# Patient Record
Sex: FEMALE | Race: WHITE | NOT HISPANIC OR LATINO | URBAN - METROPOLITAN AREA
[De-identification: names, ages, dates, MRNs, and addresses within clinical notes are randomized per-mention and may not be internally consistent; named-entity substitution may affect disease eponyms.]

---

## 2021-05-25 ENCOUNTER — APPOINTMENT (RX ONLY)
Dept: URBAN - METROPOLITAN AREA CLINIC 33 | Facility: CLINIC | Age: 61
Setting detail: DERMATOLOGY
End: 2021-05-25

## 2021-05-25 VITALS
DIASTOLIC BLOOD PRESSURE: 78 MMHG | HEART RATE: 99 BPM | SYSTOLIC BLOOD PRESSURE: 127 MMHG | HEIGHT: 64 IN | WEIGHT: 147 LBS | TEMPERATURE: 97.7 F

## 2021-05-25 DIAGNOSIS — R03.0 ELEVATED BLOOD-PRESSURE READING, WITHOUT DIAGNOSIS OF HYPERTENSION: ICD-10-CM

## 2021-05-25 DIAGNOSIS — R21 RASH AND OTHER NONSPECIFIC SKIN ERUPTION: ICD-10-CM | Status: INADEQUATELY CONTROLLED

## 2021-05-25 PROBLEM — Z11.59 ENCOUNTER FOR SCREENING FOR OTHER VIRAL DISEASES: Status: ACTIVE | Noted: 2021-05-25

## 2021-05-25 PROCEDURE — ? PLAN FOR BMI MANAGEMENT

## 2021-05-25 PROCEDURE — ? COUNSELING

## 2021-05-25 PROCEDURE — ? PRESCRIPTION

## 2021-05-25 PROCEDURE — 99204 OFFICE O/P NEW MOD 45 MIN: CPT

## 2021-05-25 PROCEDURE — ? REFERRAL CORRESPONDENCE

## 2021-05-25 PROCEDURE — ? ADDITIONAL NOTES

## 2021-05-25 RX ORDER — CLOBETASOL PROPIONATE 0.5 MG/ML
THIN LAYER SOLUTION TOPICAL
Qty: 1 | Refills: 0 | Status: ERX | COMMUNITY
Start: 2021-05-25

## 2021-05-25 RX ADMIN — CLOBETASOL PROPIONATE THIN LAYER: 0.5 SOLUTION TOPICAL at 00:00

## 2021-05-25 ASSESSMENT — LOCATION DETAILED DESCRIPTION DERM
LOCATION DETAILED: SUBXIPHOID
LOCATION DETAILED: RIGHT ANTERIOR PROXIMAL THIGH
LOCATION DETAILED: PERIUMBILICAL SKIN
LOCATION DETAILED: LEFT RIB CAGE
LOCATION DETAILED: RIGHT RIB CAGE
LOCATION DETAILED: SUPERIOR THORACIC SPINE
LOCATION DETAILED: EPIGASTRIC SKIN
LOCATION DETAILED: RIGHT INFERIOR MEDIAL UPPER BACK

## 2021-05-25 ASSESSMENT — SEVERITY ASSESSMENT: SEVERITY: MODERATE

## 2021-05-25 ASSESSMENT — LOCATION ZONE DERM
LOCATION ZONE: TRUNK
LOCATION ZONE: LEG

## 2021-05-25 ASSESSMENT — BSA RASH: BSA RASH: 5

## 2021-05-25 ASSESSMENT — LOCATION SIMPLE DESCRIPTION DERM
LOCATION SIMPLE: ABDOMEN
LOCATION SIMPLE: UPPER BACK
LOCATION SIMPLE: RIGHT UPPER BACK
LOCATION SIMPLE: RIGHT THIGH

## 2021-05-25 NOTE — PROCEDURE: COUNSELING
Detail Level: Zone
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 Today this doesn't entirely present as such- patient has no verbalized history of infection by strept no family history of psoriasis, or skin eruptions to her knowledge- and she states she has not had this process in the past.  She denies any pruritus and states other than a presence she doesn't have any symptoms associated . It may represent eczema, pityriasis rosea, possibly tinea versicolor- however that is remote - and/or could be related to dry skin or a dermatitis secondary to some exposure.  Cannot entirely eliminate guttate psoriasis.  History from patient directly not that helpful- she was given steroid oral and injectable which she notes didn't change a thing . She is not moisturizing regularly . At this point we are going to see if we can repair the skin and re-evaluate outcome then we will decide if biopsy would be helpful.   She has used. Dovonex and also Triamcinolone however suspect she isn't getting enough cream on the skin to benefit- so we will re-route it a bit and try and include more moisturizing in her regime.  She has stopped using these she voiced.  \\n\\nWe will treat with medicated CeraVe program - or she can use generic.  Written specific directions provided and will re-evaluate in 4 weeks - stressing she needs to moisturize well.  \\n1.  PURCHASE 2 jars (16 oz) of CeraVe cream. This is often found at your pharmacy store.  If you have trouble findings it ask your pharmacist.\\n2.   the prescription of Clobetasol Solution from your pharmacy.\\n3.  When you get home - take a marker to ONE OF THE JARS_  marking the label that says medicated and date the container. MIX ALL of the Clobetasol Solution into one jar of the CeraVe' cream and stir with a butter knife or another utensil - mix well. \\n4.  Apply the Medicated Cream to areas that bother you the most or the most irritated at least 2 - 3 x daily to the problem areas - (really good to do one of the times right after bathing!) \\n5.  THE  PLAIN NON-MEDICATED CeraVe cream needs to be applied neck to toes at least 1 x DAILY -go right over the areas where you used the medicated cream.  YOU MAY REAPPLY THE PLAIN CREAM AS MANY TIMES AS YOU WISH THROUGHOUT THE DAY - BUT HAS TO BE AT LEAST 1 X DAILY NECK TO TOES.\\n\\nGOAL:  To DECREASE and ELIMINATE THE MEDICATED CREAM and daily generous use of PLAIN non-medicated CERAVE CREAM.\\n\\nApplication challenges:\\n1.  If having difficulty reaching an area - options:\\n     *Small foam paint roller\\n      *Back/flat side of back brush covere with       saran or a small plastic bag\\n      *Long strip of vinyl from fabric store cast offs (smear and rub on back)\\n      *Long handled spatula\\n\\nSuggestion to help with itch - if she has note as follows-\\n1.  Wet wash cloths- put in the freezer- to become frozen and then applied to an area of itch and allowed to thaw.   A bag of frozen peas covered with a dishcloth for 10-15 minutes will also do much the same thing - this will stimulate the nerves without damaging the skin.  \\n2.  May use CeraVe ITCH cream found over the counter  - for itch relief - these DO NOT REPLACE moisturizing with the plain cream.  \\n3.  Scratching the skin does not repair the skin - it feeds the problem of itch (releases more histamine) - as well as showers and baths that are long and hot. \\n\\n\\n
Quality 317: Preventative Care And Screening: Screening For High Blood Pressure And Follow-Up Documented: Pre-hypertensive or hypertensive blood pressure reading documented, and the indicated follow-up is documented
Detail Level: Detailed

## 2021-05-25 NOTE — HPI: RASH
What Type Of Note Output Would You Prefer (Optional)?: Bullet Format
How Severe Is Your Rash?: moderate
Is This A New Presentation, Or A Follow-Up?: Rash
Additional History: Patient states she has been putting baby powder on the areas. She notes her PCP prescribed 2 different ointments and neither of them helped improve eruption areas.

## 2021-06-22 ENCOUNTER — APPOINTMENT (RX ONLY)
Dept: URBAN - METROPOLITAN AREA CLINIC 33 | Facility: CLINIC | Age: 61
Setting detail: DERMATOLOGY
End: 2021-06-22

## 2021-06-22 VITALS
WEIGHT: 147 LBS | HEIGHT: 64 IN | SYSTOLIC BLOOD PRESSURE: 129 MMHG | HEART RATE: 106 BPM | DIASTOLIC BLOOD PRESSURE: 81 MMHG | TEMPERATURE: 97.5 F

## 2021-06-22 DIAGNOSIS — R03.0 ELEVATED BLOOD-PRESSURE READING, WITHOUT DIAGNOSIS OF HYPERTENSION: ICD-10-CM

## 2021-06-22 DIAGNOSIS — R21 RASH AND OTHER NONSPECIFIC SKIN ERUPTION: ICD-10-CM | Status: IMPROVED

## 2021-06-22 PROBLEM — Z11.59 ENCOUNTER FOR SCREENING FOR OTHER VIRAL DISEASES: Status: ACTIVE | Noted: 2021-06-22

## 2021-06-22 PROCEDURE — ? ADDITIONAL NOTES

## 2021-06-22 PROCEDURE — ? PLAN FOR BMI MANAGEMENT

## 2021-06-22 PROCEDURE — ? COUNSELING

## 2021-06-22 PROCEDURE — 99213 OFFICE O/P EST LOW 20 MIN: CPT

## 2021-06-22 ASSESSMENT — LOCATION DETAILED DESCRIPTION DERM
LOCATION DETAILED: LEFT SUPERIOR MEDIAL LOWER BACK
LOCATION DETAILED: RIGHT DISTAL POSTERIOR THIGH
LOCATION DETAILED: RIGHT SUPERIOR MEDIAL LOWER BACK
LOCATION DETAILED: RIGHT INFERIOR MEDIAL UPPER BACK

## 2021-06-22 ASSESSMENT — SEVERITY ASSESSMENT: SEVERITY: MILD

## 2021-06-22 ASSESSMENT — LOCATION SIMPLE DESCRIPTION DERM
LOCATION SIMPLE: RIGHT POSTERIOR THIGH
LOCATION SIMPLE: RIGHT UPPER BACK
LOCATION SIMPLE: LEFT LOWER BACK
LOCATION SIMPLE: RIGHT LOWER BACK

## 2021-06-22 ASSESSMENT — LOCATION ZONE DERM
LOCATION ZONE: LEG
LOCATION ZONE: TRUNK

## 2021-06-22 ASSESSMENT — PAIN INTENSITY VAS: HOW INTENSE IS YOUR PAIN 0 BEING NO PAIN, 10 BEING THE MOST SEVERE PAIN POSSIBLE?: NO PAIN

## 2021-06-22 ASSESSMENT — BSA RASH: BSA RASH: 3

## 2021-06-22 NOTE — PROCEDURE: COUNSELING
Detail Level: Simple
Patient Specific Counseling (Will Not Stick From Patient To Patient): Patient is much improved in the areas that she treated and moisturized - she didn't quite follow plan . She only did treatment 1 x daily and really didn't work through things as directed.  The areas that she addressed it is resolved. Advised before we jump too many hoops on more involved things that she consider following the plan as noted for her back, buttock and leg.  Get through a full jar of plain cream before returning in 6 weeks - neck to toes.  Recommend that she moisturize moisturize moisturize . No new eruptions . Provided an updated directions - written as noted below.  \\nPatient notes she has a caregiver now which may be able to help.  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 Today this doesn't entirely present as such- patient has no verbalized history of infection by strept no family history of psoriasis, or skin eruptions to her knowledge- and she states she has not had this process in the past.  She denies any pruritus and states other than a presence she doesn't have any symptoms associated . It may represent eczema, pityriasis rosea, possibly tinea versicolor- however that is remote - and/or could be related to dry skin or a dermatitis secondary to some exposure.  Cannot entirely eliminate guttate psoriasis.  History from patient directly not that helpful- she was given steroid oral and injectable which she notes didn't change a thing . She is not moisturizing regularly . At this point we are going to see if we can repair the skin and re-evaluate outcome then we will decide if biopsy would be helpful.   She has used. Dovonex and also Triamcinolone however suspect she isn't getting enough cream on the skin to benefit- so we will re-route it a bit and try and include more moisturizing in her regime.  She has stopped using these she voiced.  \\n\\nWe will treat with medicated CeraVe program - or she can use generic.  Written specific directions provided and will re-evaluate in 4 weeks - stressing she needs to moisturize well.  \\n1.  PURCHASE 2 jars (16 oz) of CeraVe cream. This is often found at your pharmacy store.  If you have trouble findings it ask your pharmacist.\\n2.   the prescription of Clobetasol Solution from your pharmacy.\\n3.  When you get home - take a marker to ONE OF THE JARS_  marking the label that says medicated and date the container. MIX ALL of the Clobetasol Solution into one jar of the CeraVe' cream and stir with a butter knife or another utensil - mix well. \\n4.  Apply the Medicated Cream to areas that bother you the most or the most irritated at least 2 - 3 x daily to the problem areas - (really good to do one of the times right after bathing!) \\n5.  THE  PLAIN NON-MEDICATED CeraVe cream needs to be applied neck to toes at least 1 x DAILY -go right over the areas where you used the medicated cream.  YOU MAY REAPPLY THE PLAIN CREAM AS MANY TIMES AS YOU WISH THROUGHOUT THE DAY - BUT HAS TO BE AT LEAST 1 X DAILY NECK TO TOES.\\n\\nGOAL:  To DECREASE and ELIMINATE THE MEDICATED CREAM and daily generous use of PLAIN non-medicated CERAVE CREAM.\\n\\nApplication challenges:\\n1.  If having difficulty reaching an area - options:\\n     *Small foam paint roller\\n      *Back/flat side of back brush covere with       saran or a small plastic bag\\n      *Long strip of vinyl from fabric store cast offs (smear and rub on back)\\n      *Long handled spatula\\n\\nSuggestion to help with itch - if she has note as follows-\\n1.  Wet wash cloths- put in the freezer- to become frozen and then applied to an area of itch and allowed to thaw.   A bag of frozen peas covered with a dishcloth for 10-15 minutes will also do much the same thing - this will stimulate the nerves without damaging the skin.  \\n2.  May use CeraVe ITCH cream found over the counter  - for itch relief - these DO NOT REPLACE moisturizing with the plain cream.  \\n3.  Scratching the skin does not repair the skin - it feeds the problem of itch (releases more histamine) - as well as showers and baths that are long and hot.
Quality 317: Preventative Care And Screening: Screening For High Blood Pressure And Follow-Up Documented: Pre-hypertensive or hypertensive blood pressure reading documented, and the indicated follow-up is documented
Detail Level: Detailed

## 2021-06-22 NOTE — PROCEDURE: MIPS QUALITY
Quality 400a: One-Time Screening For Hepatitis C Virus (Hcv) And Treatment Initiation: Patient received one-time screening for HCV infection
Additional Notes: Patient screened for Hepatitis C - and they are receiving maintenance hemodialysis.  Recommend that they return to their primary care provider to discuss and evaluate need for further hepatitis screening. \\n\\nReviewed:\\nA Baby [place] (born between [id number]\\nGetting tattoos, body art or body piercing with unsterilized tool\\nGetting blood transfusions or having received blood products before 1992       \\nBeing a healthcare provider that had an accidental needle stick\\nUsing unsterile needles or straws with recreational drugs\\nGetting organ transplants before 1992     \\nBeing a [place] era  \\nHaving long-term dialysis for kidney disease\\nBeing born to a mother with Hep C\\nHaving HIV
Detail Level: Detailed
25.1